# Patient Record
Sex: MALE | Race: WHITE | Employment: FULL TIME | URBAN - METROPOLITAN AREA
[De-identification: names, ages, dates, MRNs, and addresses within clinical notes are randomized per-mention and may not be internally consistent; named-entity substitution may affect disease eponyms.]

---

## 2024-03-09 ENCOUNTER — HOSPITAL ENCOUNTER (OUTPATIENT)
Dept: CT IMAGING | Facility: HOSPITAL | Age: 64
Discharge: HOME | End: 2024-03-09
Attending: PHYSICIAN ASSISTANT

## 2024-03-09 VITALS
WEIGHT: 162 LBS | SYSTOLIC BLOOD PRESSURE: 130 MMHG | BODY MASS INDEX: 24.55 KG/M2 | HEIGHT: 67.99 IN | DIASTOLIC BLOOD PRESSURE: 70 MMHG

## 2024-03-09 DIAGNOSIS — Z13.6 SCREENING FOR CARDIOVASCULAR CONDITION: ICD-10-CM

## 2024-03-09 LAB
GLUCOSE POC: 95 MG/DL (ref 70–100)
HDL POC: 66 MG/DL (ref 40–60)
LDL POC: 142 MG/DL (ref 0–130)
TC/HDL RATIO: 4 (ref 0–5)
TOTAL CHOLESTEROL POC: 231 MG/DL (ref 0–200)
TRIGLYCERIDES POC: 105 MG/DL (ref 0–200)

## 2024-03-09 NOTE — PROGRESS NOTES
Date of Service 3/9/2024    JOSÉ MIGUEL STRICKLAND  Date of Birth 8/10/1960    Patient Age: 63 year old    PCP: Ben Garcia MD  [address] [place] [id number]    Heart Scan Consult  Preliminary Heart Scan Score: 47.2    Previous Screening  Heart Scan Completed Previously: No        Peripheral Vascular Scan Completed Previously: No          Risk Factors  Personal Risk Factors  Non-alterable Risk Factors: Personal History (Patients smokes 1 1/2 packs/day)  Alterable Risk Factors: Smoking;Lack of exercise      Body Mass Index  Body mass index is 24.64 kg/m².    Blood Pressure     /70 (BP Location: Right arm)     (Normal =< 120/80,  Elevated = 120-129/ >80,  High Stage1 130-139/80-89 , Stage2 >140/>90)    Lipid Profile  Patient was in fasting state: Yes    Cholesterol: 231, done on 3/9/2024.  HDL Cholesterol: 66, done on 3/9/2024.  LDL Cholesterol: 142, done on 3/9/2024.  TriGlycerides 105, done on 3/9/2024.    Cholesterol Goals  Value   Total  =< 200   HDL  = > 45 Men = > 55 Women   LDL   =< 100   Triglycerides  =< 150       Glucose and Hemoglobin A1C  Lab Results   Component Value Date    GLU 95 03/09/2024     (Normal Fasting Glucose < 100mg/dl )    Nurse Review       Recommended Follow Up:  Consult your physician regarding:: Final Heart Scan Report;Discuss potential for Incidental Finding;Discuss Potential for Score Variance;Cholesterol Results (Fasting);Exercise Program Clearance      Recommendations for Change:  Nutrition Changes: Low Saturated Fat;Low Fat Dairy;Increase Fiber (Diet includes little meat, eats fish and is on low salt diet. Encourged to increase fiber.)    Cholesterol Modification (goal of therapy depends upon your risk): Decrease LDL (Lousy/Bad) Ideal <100;Decrease Total Normal <200    Exercise: Enhance Current Program (Encouraged to include cardio exercise into routine.)    Smoking Cessation: Not Ready to Quit (Provided with smoking class information.)              Repeat Heart  Scan: 3 Years if Calcium Score is > 0.0;Discuss with your Physician              [facility] Recommended Resources:  Recommended Resources: Smoking Cessation Options [phone];Upcoming Classes, Medical Services [facility] Library [email or web address]            Sunita WHITE RN        Please Contact the Nurse Heart Line with any Questions or Concerns [phone].